# Patient Record
Sex: MALE | RURAL
[De-identification: names, ages, dates, MRNs, and addresses within clinical notes are randomized per-mention and may not be internally consistent; named-entity substitution may affect disease eponyms.]

---

## 2024-09-12 ENCOUNTER — APPOINTMENT (OUTPATIENT)
Dept: RURAL SURGERY 2 | Age: 54
Setting detail: DERMATOLOGY
End: 2024-09-12

## 2024-09-12 VITALS — WEIGHT: 225 LBS | HEIGHT: 70 IN

## 2024-09-12 DIAGNOSIS — L50.1 IDIOPATHIC URTICARIA: ICD-10-CM

## 2024-09-12 DIAGNOSIS — L90.5 SCAR CONDITIONS AND FIBROSIS OF SKIN: ICD-10-CM

## 2024-09-12 DIAGNOSIS — L24 IRRITANT CONTACT DERMATITIS: ICD-10-CM

## 2024-09-12 PROBLEM — L30.9 DERMATITIS, UNSPECIFIED: Status: ACTIVE | Noted: 2024-09-12

## 2024-09-12 PROBLEM — L24.9 IRRITANT CONTACT DERMATITIS, UNSPECIFIED CAUSE: Status: ACTIVE | Noted: 2024-09-12

## 2024-09-12 PROCEDURE — OTHER ADDITIONAL NOTES: OTHER

## 2024-09-12 PROCEDURE — OTHER PRESCRIPTION MEDICATION MANAGEMENT: OTHER

## 2024-09-12 PROCEDURE — 99203 OFFICE O/P NEW LOW 30 MIN: CPT

## 2024-09-12 PROCEDURE — OTHER COUNSELING: OTHER

## 2024-09-12 PROCEDURE — OTHER MIPS QUALITY: OTHER

## 2024-09-12 ASSESSMENT — LOCATION SIMPLE DESCRIPTION DERM
LOCATION SIMPLE: RIGHT THIGH
LOCATION SIMPLE: LEFT FOREARM
LOCATION SIMPLE: LEFT AXILLARY VAULT
LOCATION SIMPLE: RIGHT AXILLARY VAULT
LOCATION SIMPLE: RIGHT LOWER BACK
LOCATION SIMPLE: RIGHT FOREARM

## 2024-09-12 ASSESSMENT — LOCATION DETAILED DESCRIPTION DERM
LOCATION DETAILED: RIGHT ANTERIOR DISTAL THIGH
LOCATION DETAILED: RIGHT SUPERIOR MEDIAL MIDBACK
LOCATION DETAILED: LEFT AXILLARY VAULT
LOCATION DETAILED: RIGHT VENTRAL DISTAL FOREARM
LOCATION DETAILED: LEFT VENTRAL PROXIMAL FOREARM
LOCATION DETAILED: RIGHT AXILLARY VAULT

## 2024-09-12 ASSESSMENT — LOCATION ZONE DERM
LOCATION ZONE: AXILLAE
LOCATION ZONE: LEG
LOCATION ZONE: ARM
LOCATION ZONE: TRUNK

## 2024-09-12 NOTE — PROCEDURE: COUNSELING
Patient Specific Counseling (Will Not Stick From Patient To Patient): Educated that we often do not know what causes hives. To follow with Dr. Portillo as patient is already scheduled with. To ask allergist possibly about Xolair option.
Detail Level: Detailed
Patient Specific Counseling (Will Not Stick From Patient To Patient): To consider switching deodorants. Darryl is a good option.
Patient Specific Counseling (Will Not Stick From Patient To Patient): Pt had biopsy 4-5 years ago, which came back okay. Sometimes it can be crusty. Educated that it appears okay today.

## 2024-09-12 NOTE — PROCEDURE: ADDITIONAL NOTES
Render Risk Assessment In Note?: no
Additional Notes: Pt states he has been using nystatin cream to try to treat patch.\\n\\nPt currently has been using Jono’s deodorant.
Detail Level: Simple

## 2024-09-12 NOTE — HPI: RASH
Is This A New Presentation, Or A Follow-Up?: Rash
Additional History: Has been getting reoccurring hives/rashes, on arms mainly. Spots can come up in various areas, including thighs. The areas, when they start, will raise up, all of his tattoos will then puff up and itch, and lasts for a couple weeks with itchy. Burn, sting, and itch. There is also a patch in the right armpit that will also get very irritated when the hives begin.\\n\\nHas been off and on since 2019. He had an early round of COVID in 2019. When he beginning to get better from COVID, he broke out with rash.\\n\\nHas been using Clobetasol cream.\\n\\nHas also had a recent steroid injection and 5 day steroid dose pack this last weekend. So it has done slightly better.\\n\\nHas been famotidine, Zyrtec, and Claritin. Still takes the Zyrtec and Claritin. Has also tried Allegra,  it after about 3 days, he broke out.\\n\\nHas had hives 20-25 years ago after business trip. Took Benadryl and went to allergist back then. Got pin prick test at that time and was reactive to everything. Began taking Zyrtec daily then, and if he misses a few days, he breaks out. Zyrtec kept under control until he got COVID.\\n\\nGoing to allergist in a couple of weeks.